# Patient Record
Sex: FEMALE | Race: WHITE | Employment: OTHER | URBAN - METROPOLITAN AREA
[De-identification: names, ages, dates, MRNs, and addresses within clinical notes are randomized per-mention and may not be internally consistent; named-entity substitution may affect disease eponyms.]

---

## 2020-09-02 PROBLEM — F32.A DEPRESSION WITH SUICIDAL IDEATION: Status: ACTIVE | Noted: 2020-09-02

## 2020-09-02 PROBLEM — R45.851 DEPRESSION WITH SUICIDAL IDEATION: Status: ACTIVE | Noted: 2020-09-02

## 2020-09-03 PROBLEM — F43.25 ADJUSTMENT DISORDER WITH MIXED DISTURBANCE OF EMOTIONS AND CONDUCT: Status: ACTIVE | Noted: 2020-09-03

## 2023-04-14 ENCOUNTER — HOSPITAL ENCOUNTER (OUTPATIENT)
Dept: MAMMOGRAPHY | Age: 53
Discharge: HOME | End: 2023-04-16
Payer: MEDICAID

## 2023-04-14 DIAGNOSIS — R92.8 ABNORMAL MAMMOGRAM: ICD-10-CM

## 2023-04-14 PROCEDURE — G0279 TOMOSYNTHESIS, MAMMO: HCPCS

## 2023-05-01 ENCOUNTER — HOSPITAL ENCOUNTER (OUTPATIENT)
Dept: PHYSICAL THERAPY | Age: 53
Setting detail: THERAPIES SERIES
Discharge: HOME | End: 2023-05-01

## 2023-05-01 NOTE — PROGRESS NOTES
Physical Therapy Progress Note    Date: 2023  Patient Name: Vel Arce  : 1970   MRN: [id number]    Pt called to cancel PT appt  today    Romain Cote PT

## 2023-09-21 ENCOUNTER — TELEPHONE (OUTPATIENT)
Dept: MAMMOGRAPHY | Age: 53
End: 2023-09-21

## 2023-09-21 NOTE — TELEPHONE ENCOUNTER
Left message for patient to return call regarding her upcoming 6 month follow up breast imaging that is due. Phone number provided to return call.  RASHAAD Rodrigues, RN -Breast Navigator

## 2023-10-06 ENCOUNTER — TELEPHONE (OUTPATIENT)
Dept: MAMMOGRAPHY | Age: 53
End: 2023-10-06

## 2023-10-06 NOTE — TELEPHONE ENCOUNTER
2nd attempt to reach patient, left message to return call in regards to 6 month follow up breast imaging that is due. Call back number provided.  MIAN MagdalenoN, RN - Breast Navigator

## 2023-10-20 ENCOUNTER — TELEPHONE (OUTPATIENT)
Dept: MAMMOGRAPHY | Age: 53
End: 2023-10-20

## 2023-10-20 NOTE — TELEPHONE ENCOUNTER
Left message #3 for patient to return call regarding her overdue 6 month follow up breast imaging. Phone number provided to return call.  MIAN BoldenN, RN -Breast Navigator
19-Aug-2017

## 2023-10-23 ENCOUNTER — TELEPHONE (OUTPATIENT)
Dept: MAMMOGRAPHY | Age: 53
End: 2023-10-23

## 2023-10-23 NOTE — TELEPHONE ENCOUNTER
Call to office of Velma Rodríguez NP, spoke with Kaylee Raya to update patient has been unreachable for 6 month follow up breast imaging that was recommended from abnormal mammogram and ultrasound in April 2023. Kaylee Raya states she will not patients chart and forward update to Velma Rodríguez NP.  MIAN RobisonN, RN - Breast Navigator

## 2025-05-16 ENCOUNTER — TRANSCRIBE ORDERS (OUTPATIENT)
Dept: ADMINISTRATIVE | Age: 55
End: 2025-05-16

## 2025-05-16 DIAGNOSIS — Z12.31 ENCOUNTER FOR SCREENING MAMMOGRAM FOR MALIGNANT NEOPLASM OF BREAST: Primary | ICD-10-CM
